# Patient Record
Sex: MALE | Race: BLACK OR AFRICAN AMERICAN | NOT HISPANIC OR LATINO | Employment: PART TIME | URBAN - METROPOLITAN AREA
[De-identification: names, ages, dates, MRNs, and addresses within clinical notes are randomized per-mention and may not be internally consistent; named-entity substitution may affect disease eponyms.]

---

## 2022-08-17 LAB
CTP QC/QA: YES
SARS-COV-2 RDRP RESP QL NAA+PROBE: NEGATIVE

## 2022-08-17 PROCEDURE — U0002 COVID-19 LAB TEST NON-CDC: HCPCS | Performed by: PHYSICIAN ASSISTANT

## 2022-08-17 PROCEDURE — 99284 EMERGENCY DEPT VISIT MOD MDM: CPT | Mod: 25

## 2022-08-17 RX ORDER — ONDANSETRON 4 MG/1
4 TABLET, ORALLY DISINTEGRATING ORAL
Status: COMPLETED | OUTPATIENT
Start: 2022-08-17 | End: 2022-08-18

## 2022-08-17 RX ORDER — MAG HYDROX/ALUMINUM HYD/SIMETH 200-200-20
15 SUSPENSION, ORAL (FINAL DOSE FORM) ORAL
Status: COMPLETED | OUTPATIENT
Start: 2022-08-17 | End: 2022-08-18

## 2022-08-18 ENCOUNTER — HOSPITAL ENCOUNTER (EMERGENCY)
Facility: HOSPITAL | Age: 22
Discharge: HOME | End: 2022-08-18
Attending: EMERGENCY MEDICINE
Payer: COMMERCIAL

## 2022-08-18 VITALS
WEIGHT: 190 LBS | HEART RATE: 53 BPM | TEMPERATURE: 99 F | DIASTOLIC BLOOD PRESSURE: 61 MMHG | BODY MASS INDEX: 23.62 KG/M2 | SYSTOLIC BLOOD PRESSURE: 150 MMHG | RESPIRATION RATE: 19 BRPM | OXYGEN SATURATION: 98 % | HEIGHT: 75 IN

## 2022-08-18 DIAGNOSIS — R10.9 ABDOMINAL PAIN, UNSPECIFIED ABDOMINAL LOCATION: ICD-10-CM

## 2022-08-18 DIAGNOSIS — R11.2 NON-INTRACTABLE VOMITING WITH NAUSEA, UNSPECIFIED VOMITING TYPE: Primary | ICD-10-CM

## 2022-08-18 PROCEDURE — 25000003 PHARM REV CODE 250: Performed by: PHYSICIAN ASSISTANT

## 2022-08-18 RX ORDER — FAMOTIDINE 20 MG/1
20 TABLET, FILM COATED ORAL 2 TIMES DAILY
Qty: 28 TABLET | Refills: 0 | Status: SHIPPED | OUTPATIENT
Start: 2022-08-18 | End: 2022-09-01

## 2022-08-18 RX ORDER — DICYCLOMINE HYDROCHLORIDE 20 MG/1
20 TABLET ORAL 4 TIMES DAILY PRN
Qty: 20 TABLET | Refills: 0 | Status: SHIPPED | OUTPATIENT
Start: 2022-08-18 | End: 2022-09-17

## 2022-08-18 RX ORDER — ONDANSETRON 4 MG/1
4 TABLET, ORALLY DISINTEGRATING ORAL EVERY 6 HOURS PRN
Qty: 12 TABLET | Refills: 0 | Status: SHIPPED | OUTPATIENT
Start: 2022-08-18

## 2022-08-18 RX ADMIN — ONDANSETRON 4 MG: 4 TABLET, ORALLY DISINTEGRATING ORAL at 02:08

## 2022-08-18 RX ADMIN — ALUMINUM HYDROXIDE, MAGNESIUM HYDROXIDE, AND SIMETHICONE 15 ML: 200; 200; 20 SUSPENSION ORAL at 02:08

## 2022-08-18 NOTE — Clinical Note
"Jonny Ruff" Nika was seen and treated in our emergency department on 8/17/2022.  He may return to work on 08/20/2022.       If you have any questions or concerns, please don't hesitate to call.      CHANCE Silveira RN    "

## 2022-08-18 NOTE — DISCHARGE INSTRUCTIONS
BRAT diet, progress as tolerated.  Zofran as needed for nausea.  Pepcid twice daily.  Bentyl as needed for any abdominal cramping.    Follow-up and establish care with a local primary care provider for re-evaluation should your symptoms persist.    Return to this ED if you begin with fever, continued vomiting, worsening abdominal pain, if unable to eat or drink, if any other problems occur.

## 2022-08-18 NOTE — ED PROVIDER NOTES
Encounter Date: 8/17/2022       History     Chief Complaint   Patient presents with    Vomiting     Pt states that he started to have nausea with vomiting tonight no abdominal pain.      20yo M with chief complaint generalized abdominal pain, n/v which began around 5pm this evening.     Admits to hx similar symptoms in the remote past. Denies recent illness. Normal BMs. No trauma. No fever, chills, myalgias. No urinary complaints. No hx abdominal surgeries. Pain intermittent, cramping, associated n/v. No hematemesis. No recent illness. Temporary relief with hot shower.    Daily marijuana use        Review of patient's allergies indicates:  No Known Allergies  No past medical history on file.  No past surgical history on file.  No family history on file.     Review of Systems   Constitutional: Negative for appetite change and fever.   Respiratory: Negative for cough.    Cardiovascular: Negative for chest pain.   Gastrointestinal: Positive for abdominal pain, nausea and vomiting. Negative for constipation and diarrhea.   Genitourinary: Negative for dysuria, flank pain and frequency.   Musculoskeletal: Negative for back pain, myalgias, neck pain and neck stiffness.   All other systems reviewed and are negative.      Physical Exam     Initial Vitals [08/17/22 2114]   BP Pulse Resp Temp SpO2   132/72 (!) 56 18 98.2 °F (36.8 °C) 98 %      MAP       --         Physical Exam    Constitutional: He appears well-developed and well-nourished. He is not diaphoretic. No distress.   HENT:   Head: Normocephalic and atraumatic.   Neck:   Normal range of motion.  Pulmonary/Chest: No respiratory distress.   Abdominal: Abdomen is soft. Bowel sounds are normal. He exhibits no distension.   Mild suprapubic ttp. No mcburney's point ttp.   Musculoskeletal:         General: Normal range of motion.      Cervical back: Normal range of motion.     Neurological: He is alert and oriented to person, place, and time. GCS score is 15. GCS eye  subscore is 4. GCS verbal subscore is 5. GCS motor subscore is 6.   Skin: Skin is warm. Capillary refill takes less than 2 seconds.   Psychiatric: He has a normal mood and affect. Thought content normal.         ED Course   Procedures  Labs Reviewed   SARS-COV-2 RDRP GENE          Imaging Results    None          Medications   ondansetron disintegrating tablet 4 mg (4 mg Oral Given 8/18/22 0226)   aluminum-magnesium hydroxide-simethicone 200-200-20 mg/5 mL suspension 15 mL (15 mLs Oral Given 8/18/22 0227)     Medical Decision Making:   Differential Diagnosis:   Cannabinoid hyperemesis, gastroenteritis, SBO, enteritis, colitis, UTI, appendicitis             ED Course as of 08/18/22 0403   Thu Aug 18, 2022   0324 Feels better on re-evaluation.  Feels comfortable with discharge home. [SM]      ED Course User Index  [SM] Antonio Magdaleno PA-C             Clinical Impression:   Final diagnoses:  [R11.2] Non-intractable vomiting with nausea, unspecified vomiting type (Primary)  [R10.9] Abdominal pain, unspecified abdominal location          ED Disposition Condition    Discharge Stable        ED Prescriptions     Medication Sig Dispense Start Date End Date Auth. Provider    famotidine (PEPCID) 20 MG tablet Take 1 tablet (20 mg total) by mouth 2 (two) times daily. for 14 days 28 tablet 8/18/2022 9/1/2022 Antonio Magdaleno PA-C    ondansetron (ZOFRAN-ODT) 4 MG TbDL Take 1 tablet (4 mg total) by mouth every 6 (six) hours as needed (nausea). 12 tablet 8/18/2022  Antonio Magdaleno PA-C    dicyclomine (BENTYL) 20 mg tablet Take 1 tablet (20 mg total) by mouth 4 (four) times daily as needed (abdominal cramping). 20 tablet 8/18/2022 9/17/2022 Antonio Magdaleno PA-C        Follow-up Information     Follow up With Specialties Details Why Contact Info    [facility] Jose Manuel Velazquez  Schedule an appointment as soon as possible for a visit  To establish primary care physician, for reevaluation [address]  [place]  [address]  [phone]      [facility] [place]  Schedule an appointment as soon as possible for a visit  To establish primary care physician, For reevaluation [address] [facility] [address]  [phone]             Antonio Magdaleno PA-C  08/18/22 0408

## 2022-08-18 NOTE — FIRST PROVIDER EVALUATION
Emergency Department TeleTriage Encounter Note      CHIEF COMPLAINT    Chief Complaint   Patient presents with    Vomiting     Pt states that he started to have nausea with vomiting tonight no abdominal pain.        VITAL SIGNS   Initial Vitals [08/17/22 2114]   BP Pulse Resp Temp SpO2   132/72 (!) 56 18 98.2 °F (36.8 °C) 98 %      MAP       --            ALLERGIES    Review of patient's allergies indicates:  No Known Allergies    PROVIDER TRIAGE NOTE  This is a teletriage evaluation of a 21 y.o. male presenting to the ED with c/o several hours of N/V. No abdominal pain, fever, C/D. No sick contacts.     PE:. Non-toxic/well-appearing. No respiratory distress, speaks in full sentences without issue. No active emesis nor cough. Normal eye contact and mentation.     Plan: meds, PO challenge. Further/augmented workup at discretion of examining provider.     All ED beds are full at present; patient notified of this status.  Patient seen and medically screened by FELICITAS via teletriage. Orders initiated at triage to expedite care.  Patient is stable and will be placed in an ED bed when available.  Care will be transferred to an alternate provider when patient has been placed in an Exam Room further exam, additional orders, and disposition.         ORDERS  Labs Reviewed   SARS-COV-2 RDRP GENE       ED Orders (720h ago, onward)    Start Ordered     Status Ordering Provider    08/17/22 2215 08/17/22 2205  ondansetron disintegrating tablet 4 mg  ED 1 Time         Ordered RODNEY HUMPHRIES    08/17/22 2215 08/17/22 2206  aluminum-magnesium hydroxide-simethicone 200-200-20 mg/5 mL suspension 15 mL  ED 1 Time         Ordered RODNEY HUMPHRIES    08/17/22 2206 08/17/22 2205  POCT COVID-19 Rapid Screening  Once         Ordered RODNEY HUMPHRIES            Virtual Visit Note: The provider triage portion of this emergency department evaluation and documentation was performed via [employer], a HIPAA-compliant telemedicine  application, in concert with a tele-presenter in the room. A face to face patient evaluation with one of my colleagues will occur once the patient is placed in an emergency department room.      DISCLAIMER: This note was prepared with [employer] voice recognition transcription software. Garbled syntax, mangled pronouns, and other bizarre constructions may be attributed to that software system.